# Patient Record
Sex: FEMALE | Race: WHITE | Employment: OTHER | URBAN - METROPOLITAN AREA
[De-identification: names, ages, dates, MRNs, and addresses within clinical notes are randomized per-mention and may not be internally consistent; named-entity substitution may affect disease eponyms.]

---

## 2018-07-12 ENCOUNTER — OFFICE VISIT (OUTPATIENT)
Dept: ENT CLINIC | Age: 83
End: 2018-07-12
Payer: MEDICARE

## 2018-07-12 VITALS — DIASTOLIC BLOOD PRESSURE: 69 MMHG | SYSTOLIC BLOOD PRESSURE: 123 MMHG | OXYGEN SATURATION: 96 % | HEART RATE: 71 BPM

## 2018-07-12 DIAGNOSIS — S00.411A ABRASION OF RIGHT EAR CANAL, INITIAL ENCOUNTER: Primary | ICD-10-CM

## 2018-07-12 PROCEDURE — 99214 OFFICE O/P EST MOD 30 MIN: CPT | Performed by: OTOLARYNGOLOGY

## 2018-07-12 PROCEDURE — 92504 EAR MICROSCOPY EXAMINATION: CPT | Performed by: OTOLARYNGOLOGY

## 2018-07-12 RX ORDER — DIAZEPAM 5 MG/1
5 TABLET ORAL EVERY 6 HOURS PRN
COMMUNITY

## 2018-07-12 RX ORDER — GUAIFENESIN AND DEXTROMETHORPHAN HYDROBROMIDE 100; 10 MG/5ML; MG/5ML
5 SOLUTION ORAL EVERY 4 HOURS PRN
COMMUNITY

## 2018-07-12 RX ORDER — LACTULOSE 10 G/15ML
20 SOLUTION ORAL 3 TIMES DAILY
COMMUNITY

## 2018-07-12 RX ORDER — FLUTICASONE PROPIONATE 50 MCG
1 SPRAY, SUSPENSION (ML) NASAL DAILY
COMMUNITY

## 2018-07-12 RX ORDER — SENNA PLUS 8.6 MG/1
1 TABLET ORAL DAILY
COMMUNITY

## 2018-07-12 RX ORDER — HYOSCYAMINE SULFATE 0.125 MG
125 TABLET ORAL EVERY 4 HOURS PRN
COMMUNITY

## 2018-07-12 RX ORDER — MINERAL OIL 100 G/100G
1 OIL RECTAL ONCE
COMMUNITY

## 2018-07-12 RX ORDER — PROMETHAZINE HYDROCHLORIDE 25 MG/1
25 TABLET ORAL EVERY 6 HOURS PRN
COMMUNITY

## 2018-07-12 RX ORDER — MECLIZINE HYDROCHLORIDE 25 MG/1
25 TABLET ORAL 3 TIMES DAILY PRN
COMMUNITY

## 2018-07-12 RX ORDER — TRAMADOL HYDROCHLORIDE 50 MG/1
50 TABLET ORAL EVERY 6 HOURS PRN
COMMUNITY

## 2018-07-12 RX ORDER — ACETAMINOPHEN 650 MG/1
650 SUPPOSITORY RECTAL EVERY 4 HOURS PRN
COMMUNITY

## 2018-07-12 RX ORDER — MORPHINE SULFATE 100 MG/5ML
10 SOLUTION ORAL
COMMUNITY

## 2018-07-12 ASSESSMENT — ENCOUNTER SYMPTOMS
RESPIRATORY NEGATIVE: 1
SHORTNESS OF BREATH: 0
EYES NEGATIVE: 1
GASTROINTESTINAL NEGATIVE: 1
ABDOMINAL PAIN: 0
COLOR CHANGE: 0

## 2018-07-12 NOTE — PROGRESS NOTES
normal and breath sounds normal.   Abdominal: Soft. Bowel sounds are normal.   Neurological: She is alert and oriented to person, place, and time. Vitals reviewed. Dry Blood Removal Procedure     Auditory canal(s) right ear completely obstructed with dry blood. A microscope was used to gently removed using soft plastic curette, alligator forceps. Tympanic membranes are intact following the procedure. Auditory canals appear inflamed, bleeding. Assessment:       Diagnosis Orders   1. Abrasion of right ear canal, initial encounter           Plan:      - Advised patient to not use any hearing aids in the R ear  - Educated patient and daughter to use sweet oil in R ear  - Follow up in 1 month(s)     Case, assessment and plan were discussed attending physician. Jerica Garg DO  Resident Physician  [facility])  Otolaryngology Residency  7/12/2018  4:17 PM          Nicolasa Chapa  11/24/1925    I have discussed the case, including pertinent history and exam findings with the resident. I have seen and examined the patient and the key elements of the encounter have been performed by me. I agree with the assessment, plan and orders as documented by the  resident    Patient is here to establish care as a established patient in the clinic. Remainder of medical problems as per  resident note. Patient seen and examined. Agree with above exam, assessment and plan.       Electronically signed by Brent Hawkins DO on 7/12/18 at 5:21 PM

## 2019-06-28 ENCOUNTER — OUTSIDE SERVICES (OUTPATIENT)
Dept: PODIATRY | Age: 84
End: 2019-06-28
Payer: MEDICARE

## 2019-06-28 DIAGNOSIS — M79.674 PAIN OF TOE OF RIGHT FOOT: ICD-10-CM

## 2019-06-28 DIAGNOSIS — B35.1 ONYCHOMYCOSIS: Primary | ICD-10-CM

## 2019-06-28 DIAGNOSIS — I73.9 PVD (PERIPHERAL VASCULAR DISEASE) (HCC): ICD-10-CM

## 2019-06-28 DIAGNOSIS — R26.2 DIFFICULTY WALKING: ICD-10-CM

## 2019-06-28 DIAGNOSIS — M79.675 PAIN OF TOE OF LEFT FOOT: ICD-10-CM

## 2019-06-28 PROCEDURE — 11721 DEBRIDE NAIL 6 OR MORE: CPT | Performed by: PODIATRIST

## 2019-06-29 NOTE — PROGRESS NOTES
19     Naye Mccartney    : 1925 Sex: female   Age: 80 y.o. Dr. Funmi May  Last DOS: 19    Subjective:  Chief Complaint   Patient presents with    Nail Problem    Toe Pain     Patient is seen today at [facility] per nursing staff regarding foot evaluation and mycotic nail care. No other issues noted at this time. ROS:  Const: Positives and pertinent negatives as per HPI. Musculo: Denies symptoms other than stated above. Neuro: Denies symptoms other than stated above. Skin: Denies symptoms other than stated above. Current Medications:    Current Outpatient Medications:     acetaminophen (TYLENOL) 650 MG suppository, Place 650 mg rectally every 4 hours as needed for Fever, Disp: , Rfl:     diazepam (VALIUM) 5 MG tablet, Take 5 mg by mouth every 6 hours as needed for Anxiety. ., Disp: , Rfl:     carbamide peroxide (DEBROX) 6.5 % otic solution, 5 drops 2 times daily, Disp: , Rfl:     mineral oil enema, Place 1 enema rectally once, Disp: , Rfl:     fluticasone (FLONASE) 50 MCG/ACT nasal spray, 1 spray by Nasal route daily, Disp: , Rfl:     hyoscyamine (ANASPAZ;LEVSIN) 125 MCG tablet, Take 125 mcg by mouth every 4 hours as needed for Cramping, Disp: , Rfl:     lactulose (CHRONULAC) 10 GM/15ML solution, Take 20 g by mouth 3 times daily, Disp: , Rfl:     meclizine (ANTIVERT) 25 MG tablet, Take 25 mg by mouth 3 times daily as needed, Disp: , Rfl:     morphine sulfate 20 MG/ML concentrated oral solution, Take 10 mg by mouth every 2 hours as needed for Pain. ., Disp: , Rfl:     promethazine (PHENERGAN) 25 MG tablet, Take 25 mg by mouth every 6 hours as needed for Nausea, Disp: , Rfl:     NONFORMULARY, , Disp: , Rfl:     Dextromethorphan-guaiFENesin  MG/5ML SYRP, Take 5 mLs by mouth every 4 hours as needed for Cough, Disp: , Rfl:     senna (SENOKOT) 8.6 MG tablet, Take 1 tablet by mouth daily, Disp: , Rfl:     traMADol (ULTRAM) 50 MG tablet, Take 50 mg by mouth every 6 hours as [Penicillins]     Vioxx        Past Surgical History:   Procedure Laterality Date    APPENDECTOMY      CARPAL TUNNEL RELEASE      DILATION AND CURETTAGE OF UTERUS      EYE SURGERY      FOOT SURGERY       Past Medical History:   Diagnosis Date    Cellulitis     Hypertension     Parkinson disease ([address]     Thyroid disease     Torticollis          Exam:  Neurovascular status unchanged. At this time digital nails 1,2,5 right foot and nails 1,2,5 left foot are noted to be thickened, dystrophic and discolored with subungual debris present. Cap fill time is delayed in digits 1-5 bilaterally. Coolness is noted to the digital regions to palpation to the bilateral foot. Hair growth is diminished to both lower extremities. Edematous issues noted to both lower extremities. No maceration of the web spaces nor any heel fissuring is noted at this time. Plan per Assessment  Seema Shultz was seen today for nail problem and toe pain. Diagnoses and all orders for this visit:    Onychomycosis    Pain of toe of right foot    Pain of toe of left foot    PVD (peripheral vascular disease) ([facility] [address]    Difficulty walking        1. Evaluation and Management  2. Manual and electrical debridement of the mycotic nails was performed for thickness and length to prevent injection and/or ulceration. I recommended antifungal cream to the nails daily. 3. It was discussed in detail with the patient and nursing staff proper caring for the vascular compromised foot. The fact that they have compromised blood flow put the patient at risk for infection/gangrene/amputation. The patient should not walk barefoot. Shoe gear should fit properly and socks should be worn with shoes. Exercise is very important to prevent worsening of the disease process but before performing an exercise program should check with their family physician first.  If any skin lesions are noted, they are instructed to contact the office immediately.      4. We will see the patient back at a later date for continued podiatric management and care. Seen By:    Wilmer Whitney DPM    Electronically signed by Wilmer Whitney DPM on 6/28/2019 at 8:22 PM      This note was created using voice recognition software. The note was reviewed however may contain grammatical errors.

## 2019-08-02 ENCOUNTER — HOSPITAL ENCOUNTER (EMERGENCY)
Age: 84
Discharge: HOME | End: 2019-08-02
Attending: EMERGENCY MEDICINE
Payer: MEDICARE

## 2019-08-02 VITALS
HEART RATE: 89 BPM | BODY MASS INDEX: 17.84 KG/M2 | RESPIRATION RATE: 16 BRPM | DIASTOLIC BLOOD PRESSURE: 93 MMHG | HEIGHT: 58 IN | WEIGHT: 85 LBS | SYSTOLIC BLOOD PRESSURE: 143 MMHG | OXYGEN SATURATION: 93 % | TEMPERATURE: 97.4 F

## 2019-08-02 DIAGNOSIS — S81.812A LACERATION OF LEFT LOWER EXTREMITY, INITIAL ENCOUNTER: Primary | ICD-10-CM

## 2019-08-02 PROCEDURE — 99282 EMERGENCY DEPT VISIT SF MDM: CPT

## 2019-08-02 PROCEDURE — 6360000002 HC RX W HCPCS: Performed by: EMERGENCY MEDICINE

## 2019-08-02 PROCEDURE — 90471 IMMUNIZATION ADMIN: CPT | Performed by: EMERGENCY MEDICINE

## 2019-08-02 PROCEDURE — 90715 TDAP VACCINE 7 YRS/> IM: CPT | Performed by: EMERGENCY MEDICINE

## 2019-08-02 RX ADMIN — TETANUS TOXOID, REDUCED DIPHTHERIA TOXOID AND ACELLULAR PERTUSSIS VACCINE, ADSORBED 0.5 ML: 5; 2.5; 8; 8; 2.5 SUSPENSION INTRAMUSCULAR at 16:27

## 2019-08-02 ASSESSMENT — ENCOUNTER SYMPTOMS
COUGH: 0
BACK PAIN: 0
SHORTNESS OF BREATH: 0
VOMITING: 0
NAUSEA: 0
WHEEZING: 0

## 2019-08-02 NOTE — ED NOTES
CHRISTA preparing Pt for transport back to [facility] Ricardo Bravo, RN  08/02/19 [id number]

## 2019-08-02 NOTE — ED PROVIDER NOTES
wheezes. She has no rales. Abdominal: Soft. Bowel sounds are normal. There is no tenderness. There is no rebound and no guarding. Musculoskeletal: She exhibits no edema. Neurological: She is alert. Skin: Skin is warm and dry. Capillary refill takes less than 2 seconds. She is not diaphoretic. LLE curved laceration about 9 cm, minimal bleeding, through subQ tissue   Nursing note and vitals reviewed. Procedures   PROCEDURE NOTE  8/2/19       Time: 4pm    LACERATION REPAIR  Risks, benefits and alternatives (for applicable procedures below) described. Performed By: Meagan Chavira DO. Laceration #: 1. Location: LLE  Length: 9cm. The wound area was cleansend with shur-clens and draped in a sterile fashion. Local Anesthesia:  not required. The wound was explored with the following results:  no foreign body or tendon injury seen. Debridement: None. Undermining: None. Wound Margins Revised: None. Flaps Aligned: yes. The wound was closed with steri strips. Dressing:  a sterile dressing, a bandage and vaseline soaked gauze was placed. Total number steri-strips:  10. There were no additional lacerations requiring repair.           --------------------------------------------- PAST HISTORY ---------------------------------------------  Past Medical History:  has a past medical history of Cellulitis, Hypertension, Parkinson disease ([address] Thyroid disease, and Torticollis. Past Surgical History:  has a past surgical history that includes Appendectomy; Carpal tunnel release; eye surgery; Foot surgery; and Dilation and curettage of uterus. Social History:  reports that she has quit smoking. Her smoking use included cigarettes. She quit after 3.00 years of use. She has never used smokeless tobacco. She reports that she does not drink alcohol or use drugs. Family History: family history includes Diabetes in her brother and sister; Heart Disease in her father and mother;  Other in her

## 2019-09-27 ENCOUNTER — OUTSIDE SERVICES (OUTPATIENT)
Dept: PODIATRY | Age: 84
End: 2019-09-27
Payer: MEDICARE

## 2019-09-27 DIAGNOSIS — R26.2 DIFFICULTY WALKING: ICD-10-CM

## 2019-09-27 DIAGNOSIS — M79.675 PAIN OF TOE OF LEFT FOOT: ICD-10-CM

## 2019-09-27 DIAGNOSIS — I73.9 PVD (PERIPHERAL VASCULAR DISEASE) (HCC): ICD-10-CM

## 2019-09-27 DIAGNOSIS — M79.674 PAIN OF TOE OF RIGHT FOOT: ICD-10-CM

## 2019-09-27 DIAGNOSIS — B35.1 ONYCHOMYCOSIS: Primary | ICD-10-CM

## 2019-09-27 PROCEDURE — 11721 DEBRIDE NAIL 6 OR MORE: CPT | Performed by: PODIATRIST
